# Patient Record
Sex: FEMALE | Race: BLACK OR AFRICAN AMERICAN | NOT HISPANIC OR LATINO | Employment: FULL TIME | URBAN - METROPOLITAN AREA
[De-identification: names, ages, dates, MRNs, and addresses within clinical notes are randomized per-mention and may not be internally consistent; named-entity substitution may affect disease eponyms.]

---

## 2017-02-03 PROBLEM — J45.909 ASTHMA: Status: ACTIVE | Noted: 2017-02-03

## 2018-05-29 ENCOUNTER — TELEPHONE (OUTPATIENT)
Dept: ENDOCRINOLOGY | Facility: CLINIC | Age: 38
End: 2018-05-29

## 2018-05-29 NOTE — TELEPHONE ENCOUNTER
----- Message from Nedra Arnold RN sent at 5/29/2018  4:52 PM CDT -----  Contact: Nelida       ----- Message -----  From: [facility]  Sent: 5/29/2018   2:33 PM  To: [facility] Staff    Calling to scheduled for pituitary per  Dr. Princess Leong. I was not able to pull up anything in [place] because neither physician came up on new patient tree. Please call her back [phone] (home)   Thanks!

## 2018-08-20 ENCOUNTER — TELEPHONE (OUTPATIENT)
Dept: ENDOCRINOLOGY | Facility: CLINIC | Age: 38
End: 2018-08-20

## 2018-08-23 ENCOUNTER — PATIENT MESSAGE (OUTPATIENT)
Dept: ENDOCRINOLOGY | Facility: CLINIC | Age: 38
End: 2018-08-23

## 2019-01-15 ENCOUNTER — PATIENT MESSAGE (OUTPATIENT)
Dept: ENDOCRINOLOGY | Facility: CLINIC | Age: 39
End: 2019-01-15

## 2019-01-18 ENCOUNTER — OFFICE VISIT (OUTPATIENT)
Dept: ENDOCRINOLOGY | Facility: CLINIC | Age: 39
End: 2019-01-18
Payer: COMMERCIAL

## 2019-01-18 ENCOUNTER — LAB VISIT (OUTPATIENT)
Dept: LAB | Facility: HOSPITAL | Age: 39
End: 2019-01-18
Attending: INTERNAL MEDICINE
Payer: COMMERCIAL

## 2019-01-18 VITALS
HEART RATE: 82 BPM | RESPIRATION RATE: 18 BRPM | BODY MASS INDEX: 37.45 KG/M2 | DIASTOLIC BLOOD PRESSURE: 87 MMHG | HEIGHT: 64 IN | SYSTOLIC BLOOD PRESSURE: 140 MMHG | WEIGHT: 219.38 LBS

## 2019-01-18 DIAGNOSIS — E23.6 PITUITARY MASS: Primary | ICD-10-CM

## 2019-01-18 DIAGNOSIS — E23.6 PITUITARY MASS: ICD-10-CM

## 2019-01-18 DIAGNOSIS — E55.9 HYPOVITAMINOSIS D: Primary | ICD-10-CM

## 2019-01-18 DIAGNOSIS — D21.9 LEIOMYOMA: ICD-10-CM

## 2019-01-18 DIAGNOSIS — E55.9 HYPOVITAMINOSIS D: ICD-10-CM

## 2019-01-18 DIAGNOSIS — E88.819 INSULIN RESISTANCE: ICD-10-CM

## 2019-01-18 DIAGNOSIS — J45.909 UNCOMPLICATED ASTHMA, UNSPECIFIED ASTHMA SEVERITY, UNSPECIFIED WHETHER PERSISTENT: ICD-10-CM

## 2019-01-18 DIAGNOSIS — E88.810 DYSMETABOLIC SYNDROME: ICD-10-CM

## 2019-01-18 DIAGNOSIS — N97.0 INFERTILITY, ANOVULATION: ICD-10-CM

## 2019-01-18 DIAGNOSIS — Z72.820 SLEEP DEPRIVATION: ICD-10-CM

## 2019-01-18 DIAGNOSIS — I10 ESSENTIAL HYPERTENSION: ICD-10-CM

## 2019-01-18 DIAGNOSIS — E16.1 HYPERINSULINEMIA: ICD-10-CM

## 2019-01-18 DIAGNOSIS — N83.202 CYST OF LEFT OVARY: ICD-10-CM

## 2019-01-18 DIAGNOSIS — L68.0 HIRSUTISM: ICD-10-CM

## 2019-01-18 DIAGNOSIS — E66.9 CLASS 2 OBESITY WITHOUT SERIOUS COMORBIDITY IN ADULT, UNSPECIFIED BMI, UNSPECIFIED OBESITY TYPE: ICD-10-CM

## 2019-01-18 LAB
25(OH)D3+25(OH)D2 SERPL-MCNC: 13 NG/ML
ALBUMIN SERPL BCP-MCNC: 3.9 G/DL
ALP SERPL-CCNC: 63 U/L
ALT SERPL W/O P-5'-P-CCNC: 18 U/L
ANION GAP SERPL CALC-SCNC: 9 MMOL/L
AST SERPL-CCNC: 29 U/L
BASOPHILS # BLD AUTO: 0.02 K/UL
BASOPHILS NFR BLD: 0.3 %
BILIRUB SERPL-MCNC: 0.3 MG/DL
BUN SERPL-MCNC: 13 MG/DL
CA-I BLDV-SCNC: 1.3 MMOL/L
CALCIUM SERPL-MCNC: 9.6 MG/DL
CHLORIDE SERPL-SCNC: 106 MMOL/L
CHOLEST SERPL-MCNC: 139 MG/DL
CHOLEST/HDLC SERPL: 3 {RATIO}
CO2 SERPL-SCNC: 22 MMOL/L
CORTIS SERPL-MCNC: 7.4 UG/DL
CREAT SERPL-MCNC: 0.8 MG/DL
DHEA-S SERPL-MCNC: 311.5 UG/DL
DIFFERENTIAL METHOD: ABNORMAL
EOSINOPHIL # BLD AUTO: 0.4 K/UL
EOSINOPHIL NFR BLD: 6.4 %
ERYTHROCYTE [DISTWIDTH] IN BLOOD BY AUTOMATED COUNT: 13.7 %
EST. GFR  (AFRICAN AMERICAN): >60 ML/MIN/1.73 M^2
EST. GFR  (NON AFRICAN AMERICAN): >60 ML/MIN/1.73 M^2
ESTIMATED AVG GLUCOSE: 105 MG/DL
ESTRADIOL SERPL-MCNC: 52 PG/ML
FSH SERPL-ACNC: 9.2 MIU/ML
GLUCOSE SERPL-MCNC: 80 MG/DL
HBA1C MFR BLD HPLC: 5.3 %
HCT VFR BLD AUTO: 36.5 %
HDLC SERPL-MCNC: 47 MG/DL
HDLC SERPL: 33.8 %
HGB BLD-MCNC: 11.4 G/DL
IMM GRANULOCYTES # BLD AUTO: 0.01 K/UL
IMM GRANULOCYTES NFR BLD AUTO: 0.2 %
INSULIN COLLECTION INTERVAL: 0
INSULIN SERPL-ACNC: 15.5 UU/ML
LDLC SERPL CALC-MCNC: 85.8 MG/DL
LH SERPL-ACNC: 4.9 MIU/ML
LYMPHOCYTES # BLD AUTO: 2.6 K/UL
LYMPHOCYTES NFR BLD: 45.4 %
MCH RBC QN AUTO: 26.7 PG
MCHC RBC AUTO-ENTMCNC: 31.2 G/DL
MCV RBC AUTO: 86 FL
MONOCYTES # BLD AUTO: 0.3 K/UL
MONOCYTES NFR BLD: 5.8 %
NEUTROPHILS # BLD AUTO: 2.4 K/UL
NEUTROPHILS NFR BLD: 41.9 %
NONHDLC SERPL-MCNC: 92 MG/DL
NRBC BLD-RTO: 0 /100 WBC
OSMOLALITY SERPL: 290 MOSM/KG
PLATELET # BLD AUTO: 307 K/UL
PMV BLD AUTO: 10.6 FL
POTASSIUM SERPL-SCNC: 4 MMOL/L
PROLACTIN SERPL IA-MCNC: 21.3 NG/ML
PROT SERPL-MCNC: 8.2 G/DL
RBC # BLD AUTO: 4.27 M/UL
SODIUM SERPL-SCNC: 137 MMOL/L
T3 SERPL-MCNC: 99 NG/DL
T4 FREE SERPL-MCNC: 1.19 NG/DL
TRIGL SERPL-MCNC: 31 MG/DL
TSH SERPL DL<=0.005 MIU/L-ACNC: 1.71 UIU/ML
URATE SERPL-MCNC: 4.2 MG/DL
WBC # BLD AUTO: 5.82 K/UL

## 2019-01-18 PROCEDURE — 83498 ASY HYDROXYPROGESTERONE 17-D: CPT

## 2019-01-18 PROCEDURE — 82088 ASSAY OF ALDOSTERONE: CPT

## 2019-01-18 PROCEDURE — 3077F SYST BP >= 140 MM HG: CPT | Mod: CPTII,S$GLB,, | Performed by: INTERNAL MEDICINE

## 2019-01-18 PROCEDURE — 99205 OFFICE O/P NEW HI 60 MIN: CPT | Mod: S$GLB,,, | Performed by: INTERNAL MEDICINE

## 2019-01-18 PROCEDURE — 3079F PR MOST RECENT DIASTOLIC BLOOD PRESSURE 80-89 MM HG: ICD-10-PCS | Mod: CPTII,S$GLB,, | Performed by: INTERNAL MEDICINE

## 2019-01-18 PROCEDURE — 82040 ASSAY OF SERUM ALBUMIN: CPT

## 2019-01-18 PROCEDURE — 80053 COMPREHEN METABOLIC PANEL: CPT

## 2019-01-18 PROCEDURE — 82384 ASSAY THREE CATECHOLAMINES: CPT

## 2019-01-18 PROCEDURE — 82157 ASSAY OF ANDROSTENEDIONE: CPT

## 2019-01-18 PROCEDURE — 83930 ASSAY OF BLOOD OSMOLALITY: CPT

## 2019-01-18 PROCEDURE — 84307 ASSAY OF SOMATOSTATIN: CPT

## 2019-01-18 PROCEDURE — 83970 ASSAY OF PARATHORMONE: CPT

## 2019-01-18 PROCEDURE — 80061 LIPID PANEL: CPT

## 2019-01-18 PROCEDURE — 83002 ASSAY OF GONADOTROPIN (LH): CPT

## 2019-01-18 PROCEDURE — 82626 DEHYDROEPIANDROSTERONE: CPT

## 2019-01-18 PROCEDURE — 84443 ASSAY THYROID STIM HORMONE: CPT

## 2019-01-18 PROCEDURE — 3077F PR MOST RECENT SYSTOLIC BLOOD PRESSURE >= 140 MM HG: ICD-10-PCS | Mod: CPTII,S$GLB,, | Performed by: INTERNAL MEDICINE

## 2019-01-18 PROCEDURE — 99999 PR PBB SHADOW E&M-EST. PATIENT-LVL IV: CPT | Mod: PBBFAC,,, | Performed by: INTERNAL MEDICINE

## 2019-01-18 PROCEDURE — 3079F DIAST BP 80-89 MM HG: CPT | Mod: CPTII,S$GLB,, | Performed by: INTERNAL MEDICINE

## 2019-01-18 PROCEDURE — 84439 ASSAY OF FREE THYROXINE: CPT

## 2019-01-18 PROCEDURE — 82024 ASSAY OF ACTH: CPT

## 2019-01-18 PROCEDURE — 83520 IMMUNOASSAY QUANT NOS NONAB: CPT

## 2019-01-18 PROCEDURE — 82672 ASSAY OF ESTROGEN: CPT

## 2019-01-18 PROCEDURE — 83880 ASSAY OF NATRIURETIC PEPTIDE: CPT

## 2019-01-18 PROCEDURE — 84550 ASSAY OF BLOOD/URIC ACID: CPT

## 2019-01-18 PROCEDURE — 99999 PR PBB SHADOW E&M-EST. PATIENT-LVL IV: ICD-10-PCS | Mod: PBBFAC,,, | Performed by: INTERNAL MEDICINE

## 2019-01-18 PROCEDURE — 83001 ASSAY OF GONADOTROPIN (FSH): CPT

## 2019-01-18 PROCEDURE — 3008F BODY MASS INDEX DOCD: CPT | Mod: CPTII,S$GLB,, | Performed by: INTERNAL MEDICINE

## 2019-01-18 PROCEDURE — 83003 ASSAY GROWTH HORMONE (HGH): CPT

## 2019-01-18 PROCEDURE — 3008F PR BODY MASS INDEX (BMI) DOCUMENTED: ICD-10-PCS | Mod: CPTII,S$GLB,, | Performed by: INTERNAL MEDICINE

## 2019-01-18 PROCEDURE — 86316 IMMUNOASSAY TUMOR OTHER: CPT

## 2019-01-18 PROCEDURE — 84588 ASSAY OF VASOPRESSIN: CPT

## 2019-01-18 PROCEDURE — 82670 ASSAY OF TOTAL ESTRADIOL: CPT

## 2019-01-18 PROCEDURE — 84244 ASSAY OF RENIN: CPT

## 2019-01-18 PROCEDURE — 83520 IMMUNOASSAY QUANT NOS NONAB: CPT | Mod: 59

## 2019-01-18 PROCEDURE — 85025 COMPLETE CBC W/AUTO DIFF WBC: CPT

## 2019-01-18 PROCEDURE — 82306 VITAMIN D 25 HYDROXY: CPT

## 2019-01-18 PROCEDURE — 83525 ASSAY OF INSULIN: CPT

## 2019-01-18 PROCEDURE — 83036 HEMOGLOBIN GLYCOSYLATED A1C: CPT

## 2019-01-18 PROCEDURE — 82330 ASSAY OF CALCIUM: CPT

## 2019-01-18 PROCEDURE — 84480 ASSAY TRIIODOTHYRONINE (T3): CPT

## 2019-01-18 PROCEDURE — 84146 ASSAY OF PROLACTIN: CPT

## 2019-01-18 PROCEDURE — 82533 TOTAL CORTISOL: CPT

## 2019-01-18 PROCEDURE — 84305 ASSAY OF SOMATOMEDIN: CPT

## 2019-01-18 PROCEDURE — 99205 PR OFFICE/OUTPT VISIT, NEW, LEVL V, 60-74 MIN: ICD-10-PCS | Mod: S$GLB,,, | Performed by: INTERNAL MEDICINE

## 2019-01-18 PROCEDURE — 82627 DEHYDROEPIANDROSTERONE: CPT

## 2019-01-18 RX ORDER — MINERAL OIL
180 ENEMA (ML) RECTAL DAILY
COMMUNITY

## 2019-01-18 RX ORDER — ERGOCALCIFEROL 1.25 MG/1
50000 CAPSULE ORAL
Qty: 12 CAPSULE | Refills: 3 | Status: SHIPPED | OUTPATIENT
Start: 2019-01-18 | End: 2019-04-18

## 2019-01-18 RX ORDER — LABETALOL 100 MG/1
TABLET, FILM COATED ORAL
COMMUNITY
End: 2024-02-05 | Stop reason: SDUPTHER

## 2019-01-18 RX ORDER — AZELASTINE HYDROCHLORIDE, FLUTICASONE PROPIONATE 137; 50 UG/1; UG/1
SPRAY, METERED NASAL
COMMUNITY

## 2019-01-18 RX ORDER — METFORMIN HYDROCHLORIDE 500 MG/1
500 TABLET ORAL 2 TIMES DAILY WITH MEALS
Qty: 180 TABLET | Refills: 3 | Status: SHIPPED | OUTPATIENT
Start: 2019-01-18 | End: 2020-02-28

## 2019-01-18 RX ORDER — AMLODIPINE BESYLATE 10 MG/1
TABLET ORAL
COMMUNITY
End: 2021-05-14

## 2019-01-18 NOTE — PROGRESS NOTES
Subjective:      Patient ID: Nelida Naqvi is a 38 y.o. female.    Chief Complaint:      38 yr old lady seen for initial care visit today on account of pituitary mass.    History of Present Illness    Patient is a 38 yr old lady seen for initial care visit today on account of reported pituitary mass.  Her back ground comorbidities are as detailed below;  Patient Active Problem List   Diagnosis    Obesity    Leiomyoma    Asthma    Pituitary mass    Dysmetabolic syndrome    Essential hypertension     Patients baseline [place] score is 16. Patient has not had a prior sleep study.  Patient reportedly snores and possible apneic spells. Reportedly also has waht appear to be intermittent gasping sounds during sleep.  She does not have early morning headaches. She dreams frequently. She does have mid day somnolence.   She is not a restless sleeper.  Patient was found to have prolactin elevation and had been on bromocriptine and then dostinex. She has been of the medications since September 2018 and this was all found in the course of work up for infertility.  Most recent prolactin from 12/18 was 21.9 (wnl). She had HBA1c of 5.1 , ACTh of 10.5 and IGF-1 of 168 from 05/18 which were all normal.  Patient never had galactorrrhea.  Patient has had irregular periods; irregular both in length and monthly occurrence. K; 7-10/  28-30 menorrhagia ++, Has no major dysmenorrhea but does have premenstrual cramping.  She does have a history of significant dyspepsia. No family history of major peptic ulcer disease.  There is no family history calcium problems or kidney stones. No family histiry of pituitary nor brain tumors.  Patient has hirsutism since her 20s. There is some family history of hirsuitism  Menarche; 8. Her periods had been quite regular when she was younger. She has no sisters.  There is no history of cosanguintiy.  Grav 0. She does have a history of infertility.      Patient has seen a reproductive  "endocrinologist (Dr Tipton @ [facility]) but had just a basic evaluation and was being considered for ovulation induction.   There is s strong family history of diabetes especially on paternal side of her family.  Patient does not smoke.  Patient drinks 1-2 a months; mainly wine.  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      Review of Systems   Constitutional: Negative for activity change, appetite change, chills, diaphoresis, fatigue and unexpected weight change.   HENT: Negative for facial swelling, sore throat, trouble swallowing and voice change.    Eyes: Negative for photophobia and visual disturbance.   Respiratory: Negative for cough and shortness of breath.    Cardiovascular: Negative for chest pain, palpitations and leg swelling.   Gastrointestinal: Negative for abdominal distention, abdominal pain, constipation, diarrhea, nausea and vomiting.   Endocrine: Negative for cold intolerance, heat intolerance, polydipsia, polyphagia and polyuria.   Genitourinary: Negative for dysuria, frequency and urgency.   Musculoskeletal: Negative for arthralgias, back pain, gait problem, joint swelling, myalgias, neck pain and neck stiffness.   Skin: Negative for color change, pallor and rash.   Neurological: Negative for dizziness, tremors, seizures, weakness, light-headedness, numbness and headaches.   Hematological: Does not bruise/bleed easily.   Psychiatric/Behavioral: Positive for sleep disturbance (As detailed above). Negative for agitation, confusion, dysphoric mood and hallucinations. The patient is not nervous/anxious.        Objective: Resp 18   Ht 5' 4" (1.626 m)   Wt 99.5 kg (219 lb 5.7 oz)   LMP 12/18/2018   BMI 37.65 kg/m²  Resp 18   Ht 5' 4" (1.626 m)   Wt 99.5 kg (219 lb 5.7 oz)   LMP 12/18/2018   BMI 37.65 kg/m²  Body surface area is 2.12 meters squared. Resp 18   Ht 5' 4" (1.626 m)   Wt 99.5 kg (219 lb 5.7 oz)   LMP 12/18/2018   BMI 37.65 kg/m²  /87 RR; 18               Physical Exam "   Constitutional: She is oriented to person, place, and time. Vital signs are normal. She appears well-developed and well-nourished. She does not appear ill. No distress.   Pleasant young lady. Not pale, anicteric and afebrile. Well hydrated. Not in any acute distress. Mildly hirsuite; manly in the chin and upper lip areas.  Has associated mild facial acne and a few neck and upper chest skin tags.   HENT:   Head: Normocephalic and atraumatic. Not macrocephalic. Head is without right periorbital erythema and without left periorbital erythema. Hair is normal.       Nose: Nose normal.   Mouth/Throat: Oropharynx is clear and moist. Mucous membranes are not pale and not dry. No oropharyngeal exudate.   Has nuchal AN and mallampti grade 3 fauces with large non inflammed tonsils than nearl abut in the mid line.   Eyes: Conjunctivae, EOM and lids are normal. Pupils are equal, round, and reactive to light. Right eye exhibits no discharge. Left eye exhibits no discharge. No scleral icterus.   Neck: Trachea normal, normal range of motion, full passive range of motion without pain and phonation normal. Neck supple. Normal carotid pulses and no JVD present. Carotid bruit is not present. No tracheal deviation present. No thyroid mass and no thyromegaly present.       Cardiovascular: Normal rate, regular rhythm, S1 normal, S2 normal, normal heart sounds, intact distal pulses and normal pulses. PMI is not displaced. Exam reveals no gallop.   No murmur heard.  Pulmonary/Chest: Effort normal and breath sounds normal. No respiratory distress. She has no wheezes. She has no rales.   Abdominal: Soft. Bowel sounds are normal. She exhibits no distension and no mass. There is no hepatosplenomegaly, splenomegaly or hepatomegaly. There is no tenderness. There is no rebound, no guarding and no CVA tenderness. No hernia. Hernia confirmed negative in the ventral area.   Obese anterior abdominal wall with no striae.   Musculoskeletal: She  exhibits no edema or tenderness.        Right shoulder: She exhibits normal range of motion, no bony tenderness, no crepitus, no deformity, no pain, no spasm, normal pulse and normal strength.   No pedal edema and no calf swelling.   Lymphadenopathy:     She has no cervical adenopathy.        Right: No inguinal adenopathy present.        Left: No inguinal adenopathy present.   Neurological: She is alert and oriented to person, place, and time. She has normal strength and normal reflexes. She displays no atrophy, no tremor and normal reflexes. No cranial nerve deficit or sensory deficit. She exhibits normal muscle tone. Coordination and gait normal.   Skin: Skin is warm, dry and intact. No bruising, no ecchymosis, no petechiae and no rash noted. She is not diaphoretic. No cyanosis or erythema. No pallor. Nails show no clubbing.   Psychiatric: She has a normal mood and affect. Her speech is normal and behavior is normal. Judgment and thought content normal. Cognition and memory are normal.   Nursing note and vitals reviewed.      Lab Review:   No recent labs available for review in the [facility] data repository.    Assessment:     1. Pituitary mass  MRI Brain W WO Contrast    Alpha Sub Unit    Arginine vasopressin hormone    Brain natriuretic peptide    Insulin-like growth factor    IGF Binding Protein 3    Chromogranin A    Comprehensive metabolic panel    Luteinizing hormone    Follicle stimulating hormone    Prolactin    Somatostatin    CBC auto differential    Cortisol    ACTH    Catecholamines, fractionated, plasma    Osmolality, Serum    Osmolality, urine    Testosterone Panel    Estrogens, total    Estradiol    Growth hormone   2. Dysmetabolic syndrome  Comprehensive metabolic panel    TSH    T4, free    T3    CBC auto differential    Hemoglobin A1c    Uric acid    metFORMIN (GLUCOPHAGE) 500 MG tablet   3. Essential hypertension  Comprehensive metabolic panel    TSH    T4, free    T3    Catecholamines,  fractionated, plasma    Microalbumin/creatinine urine ratio    Lipid panel    Aldosterone    Renin   4. Leiomyoma     5. Class 2 obesity without serious comorbidity in adult, unspecified BMI, unspecified obesity type     6. Uncomplicated asthma, unspecified asthma severity, unspecified whether persistent     7. Hypovitaminosis D  Vitamin D    PTH, intact    Calcium, ionized   8. Cyst of left ovary     9. Hirsutism  Insulin, random    DHEA    DHEA-sulfate    Androstenedione    17-Hydroxyprogesterone    metFORMIN (GLUCOPHAGE) 500 MG tablet   10. Infertility, anovulation        Regarding pituitary mass; this appears to be a pituitary microadenoma incidentally found that has little clinical imprt at this point. Will check full adeno and neurophyophyseal function tests. To retrieve results of recent brain MRI for review. If  Current prolactin is normal wont need any active treatmnent for this at the moment.  Regarding dysmetabolic syndrome and obesity; to obtain screening labs as detailed above to evaluate current glycemic status and lipid profile.  Regarding cysts in ovary and hirsutism; likely has PCOS but will obtain full endocrine testing to evaluate for this and exclude improtant diff diagnoses like CAH. To commence low dose metformin 500mg BID and also to commence ovulation testing. If she is not ovulating consistently will then commence trial of clomiphene to improve this and enhance her fertility.  Regarding infertility; likely related to PCOs. Will await results of full endocrine and repro work up in this regard and will discuss this in more depth at her ext ffup visit. To rettrive and review her prior pelvic USS from last year.  Regarding sleep deprivation; to obtain screening Polysomogram.  Regarding essential hypertension; mildly elevated BP today. To continue present antihypertensive regimen for now. To ensure ambulatory tracking of BP trends and at ffup visit depending on results may adjust present  antihypertenive regimen.  I spent over 45minutes face to face discussing and counselling with the patient the intended plan of investigation and management.       Plan:       FFup in ~ 3mths

## 2019-01-19 LAB
BNP SERPL-MCNC: <10 PG/ML
IGF BP3 SERPL-MCNC: 4.2 MCG/ML
PTH-INTACT SERPL-MCNC: 103 PG/ML

## 2019-01-21 LAB
17OHP SERPL-MCNC: 107 NG/DL
ALDOST SERPL-MCNC: 5.1 NG/DL
ESTROGEN SERPL-MCNC: 173 PG/ML
IGF-I SERPL-MCNC: 148 NG/ML (ref 54–258)
IGF-I Z-SCORE SERPL: 0.39 SD

## 2019-01-22 LAB
ACTH PLAS-MCNC: 22 PG/ML
CGA SERPL-MCNC: 75 NG/ML (ref 0–95)
GH SERPL-MCNC: 0.3 NG/ML
RENIN PLAS-CCNC: 0.6 NG/ML/H

## 2019-01-23 LAB
A-PGH SER-MCNC: 0.4 NG/ML
ALBUMIN SERPL-MCNC: 4.5 G/DL (ref 3.6–5.1)
ANDROST SERPL-MCNC: 93 NG/DL
DHEA SERPL-MCNC: 3.57 NG/ML (ref 0.63–4.7)
SHBG SERPL-SCNC: 46 NMOL/L (ref 17–124)
TESTOST FREE SERPL-MCNC: 2.9 PG/ML (ref 0.2–5)
TESTOST SERPL-MCNC: 32 NG/DL (ref 2–45)
TESTOSTERONE.FREE+WB SERPL-MCNC: 6 NG/DL (ref 0.5–8.5)

## 2019-01-27 LAB
CATECHOLS PLAS-MCNC: 656 PG/ML
DOPAMINE SERPL-MCNC: 25 PG/ML
EPINEPH PLAS-MCNC: <20 PG/ML
NOREPINEPH PLAS-MCNC: 631 PG/ML
VASOPRESSIN SERPL-MCNC: 1.1 PG/ML (ref 0–6.9)

## 2019-02-02 LAB — SOMATOSTAT PLAS-MCNC: 25 PG/ML

## 2019-05-28 ENCOUNTER — OFFICE VISIT (OUTPATIENT)
Dept: ENDOCRINOLOGY | Facility: CLINIC | Age: 39
End: 2019-05-28
Payer: COMMERCIAL

## 2019-05-28 VITALS
HEIGHT: 65 IN | HEART RATE: 88 BPM | DIASTOLIC BLOOD PRESSURE: 86 MMHG | SYSTOLIC BLOOD PRESSURE: 144 MMHG | BODY MASS INDEX: 35.37 KG/M2 | WEIGHT: 212.31 LBS

## 2019-05-28 DIAGNOSIS — L68.0 HIRSUTISM: ICD-10-CM

## 2019-05-28 DIAGNOSIS — I10 ESSENTIAL HYPERTENSION: ICD-10-CM

## 2019-05-28 DIAGNOSIS — N97.0 INFERTILITY, ANOVULATION: ICD-10-CM

## 2019-05-28 DIAGNOSIS — E88.819 INSULIN RESISTANCE: ICD-10-CM

## 2019-05-28 DIAGNOSIS — E88.810 DYSMETABOLIC SYNDROME: ICD-10-CM

## 2019-05-28 DIAGNOSIS — E16.1 HYPERINSULINEMIA: ICD-10-CM

## 2019-05-28 DIAGNOSIS — E66.9 OBESITY (BMI 30-39.9): ICD-10-CM

## 2019-05-28 DIAGNOSIS — E23.6 PITUITARY MASS: Primary | ICD-10-CM

## 2019-05-28 PROCEDURE — 99214 OFFICE O/P EST MOD 30 MIN: CPT | Mod: S$GLB,,, | Performed by: INTERNAL MEDICINE

## 2019-05-28 PROCEDURE — 3079F PR MOST RECENT DIASTOLIC BLOOD PRESSURE 80-89 MM HG: ICD-10-PCS | Mod: CPTII,S$GLB,, | Performed by: INTERNAL MEDICINE

## 2019-05-28 PROCEDURE — 3077F SYST BP >= 140 MM HG: CPT | Mod: CPTII,S$GLB,, | Performed by: INTERNAL MEDICINE

## 2019-05-28 PROCEDURE — 3008F BODY MASS INDEX DOCD: CPT | Mod: CPTII,S$GLB,, | Performed by: INTERNAL MEDICINE

## 2019-05-28 PROCEDURE — 99214 PR OFFICE/OUTPT VISIT, EST, LEVL IV, 30-39 MIN: ICD-10-PCS | Mod: S$GLB,,, | Performed by: INTERNAL MEDICINE

## 2019-05-28 PROCEDURE — 3008F PR BODY MASS INDEX (BMI) DOCUMENTED: ICD-10-PCS | Mod: CPTII,S$GLB,, | Performed by: INTERNAL MEDICINE

## 2019-05-28 PROCEDURE — 99999 PR PBB SHADOW E&M-EST. PATIENT-LVL III: ICD-10-PCS | Mod: PBBFAC,,, | Performed by: INTERNAL MEDICINE

## 2019-05-28 PROCEDURE — 3077F PR MOST RECENT SYSTOLIC BLOOD PRESSURE >= 140 MM HG: ICD-10-PCS | Mod: CPTII,S$GLB,, | Performed by: INTERNAL MEDICINE

## 2019-05-28 PROCEDURE — 3079F DIAST BP 80-89 MM HG: CPT | Mod: CPTII,S$GLB,, | Performed by: INTERNAL MEDICINE

## 2019-05-28 PROCEDURE — 99999 PR PBB SHADOW E&M-EST. PATIENT-LVL III: CPT | Mod: PBBFAC,,, | Performed by: INTERNAL MEDICINE

## 2019-05-28 RX ORDER — ASPIRIN 81 MG/1
81 TABLET ORAL DAILY
COMMUNITY

## 2019-05-28 NOTE — PROGRESS NOTES
Subjective:      Patient ID: Nelida Naqvi is a 39 y.o. female.    Chief Complaint:     39 yr old lady seen in [facility] today on account of pituitary mass.       History of Present Illness    Patient is a 38 yr old lady seen for initial care visit today on account of reported pituitary mass.  Her back ground comorbidities are as detailed below;      Patient Active Problem List   Diagnosis    Obesity    Leiomyoma    Asthma    Pituitary mass    Dysmetabolic syndrome    Essential hypertension      Patients baseline [place] score is 16. Patient has not had a prior sleep study.  Patient reportedly snores and possible apneic spells. Reportedly also has waht appear to be intermittent gasping sounds during sleep.  She does not have early morning headaches. She dreams frequently. She does have mid day somnolence.   She is not a restless sleeper.  Patient was found to have prolactin elevation and had been on bromocriptine and then dostinex. She has been of the medications since September 2018 and this was all found in the course of work up for infertility.  Most recent prolactin from 12/18 was 21.9 (wnl). She had HBA1c of 5.1 , ACTh of 10.5 and IGF-1 of 168 from 05/18 which were all normal.  Patient never had galactorrrhea.  Patient has had irregular periods; irregular both in length and monthly occurrence. K; 7-10/  28-30 menorrhagia ++, Has no major dysmenorrhea but does have premenstrual cramping.  She does have a history of significant dyspepsia. No family history of major peptic ulcer disease.  There is no family history calcium problems or kidney stones. No family histiry of pituitary nor brain tumors.  Patient has hirsutism since her 20s. There is some family history of hirsuitism.    Menarche; 8. Her periods had been quite regular when she was younger. She has no sisters.  There is no history of cosanguintiy.  Grav 0. She does have a history of infertility.        Patient has seen a reproductive  "endocrinologist (Dr Tipton @ [facility]) but had just a basic evaluation and was being considered for ovulation induction.   There is s strong family history of diabetes especially on paternal side of her family.  Patient does not smoke.  Patient drinks 1-2 a months; mainly wine.  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  Review of the baseline pituitary functional screening labs done during her initial visit with us showed no functional adeno nor neurohypophyseal deficits.    Review of Systems   HENT: Negative for facial swelling and trouble swallowing.    Respiratory: Negative for wheezing.    Cardiovascular: Negative for chest pain, palpitations and leg swelling.   Gastrointestinal: Negative for abdominal pain, diarrhea and vomiting.   Endocrine: Negative for polyuria.   Genitourinary: Negative for dysuria and menstrual problem.   Musculoskeletal: Negative for arthralgias, gait problem and myalgias.   Skin: Negative for color change, pallor and rash.   Neurological: Negative for dizziness and light-headedness.   Hematological: Does not bruise/bleed easily.   Psychiatric/Behavioral: Positive for sleep disturbance. Negative for dysphoric mood.       Objective: BP (!) 144/86 (BP Location: Right arm, Patient Position: Sitting, BP Method: Medium (Automatic))   Pulse 88   Ht 5' 5" (1.651 m)   Wt 96.3 kg (212 lb 4.9 oz)   BMI 35.33 kg/m²  Body surface area is 2.1 meters squared.         Physical Exam   Constitutional: She appears well-developed and well-nourished. No distress.   Pleasant middle aged lady. Not pale, anicteric, afebrile. Well hydrated. Not in any acute distress.    HENT:   Head: Normocephalic and atraumatic.   Eyes: Pupils are equal, round, and reactive to light. Conjunctivae and EOM are normal. No scleral icterus.   Neck: Normal range of motion. Neck supple. No JVD present. No tracheal deviation present. No thyromegaly present.   Cardiovascular: Normal rate, regular rhythm and normal heart sounds. "   Pulmonary/Chest: Effort normal and breath sounds normal. No stridor. No respiratory distress. She has no wheezes.   Abdominal: Soft. Bowel sounds are normal.   Obese anterior abdominal wall.   Musculoskeletal: She exhibits no edema.   Neurological: She is alert. No cranial nerve deficit.   Skin: Skin is warm and dry. No rash noted. She is not diaphoretic. No erythema. No pallor.   Psychiatric: She has a normal mood and affect. Her behavior is normal. Judgment and thought content normal.   Vitals reviewed.      Lab Review:     Results for EULA MUÑIZ (MRN [id number]) as of 5/28/2019 16:41   Ref. Range 1/18/2019 09:21 1/18/2019 09:40 1/18/2019 09:41   WBC Latest Ref Range: 3.90 - 12.70 K/uL   5.82   RBC Latest Ref Range: 4.00 - 5.40 M/uL   4.27   Hemoglobin Latest Ref Range: 12.0 - 16.0 g/dL   11.4 (L)   Hematocrit Latest Ref Range: 37.0 - 48.5 %   36.5 (L)   MCV Latest Ref Range: 82 - 98 fL   86   MCH Latest Ref Range: 27.0 - 31.0 pg   26.7 (L)   MCHC Latest Ref Range: 32.0 - 36.0 g/dL   31.2 (L)   RDW Latest Ref Range: 11.5 - 14.5 %   13.7   Platelets Latest Ref Range: 150 - 350 K/uL   307   MPV Latest Ref Range: 9.2 - 12.9 fL   10.6   Gran% Latest Ref Range: 38.0 - 73.0 %   41.9   Gran # (ANC) Latest Ref Range: 1.8 - 7.7 K/uL   2.4   Lymph% Latest Ref Range: 18.0 - 48.0 %   45.4   Lymph # Latest Ref Range: 1.0 - 4.8 K/uL   2.6   Mono% Latest Ref Range: 4.0 - 15.0 %   5.8   Mono # Latest Ref Range: 0.3 - 1.0 K/uL   0.3   Eosinophil% Latest Ref Range: 0.0 - 8.0 %   6.4   Eos # Latest Ref Range: 0.0 - 0.5 K/uL   0.4   Basophil% Latest Ref Range: 0.0 - 1.9 %   0.3   Baso # Latest Ref Range: 0.00 - 0.20 K/uL   0.02   nRBC Latest Ref Range: 0 /100 WBC   0   Differential Method Unknown   Automated   Immature Grans (Abs) Latest Ref Range: 0.00 - 0.04 K/uL   0.01   Immature Granulocytes Latest Ref Range: 0.0 - 0.5 %   0.2   Sodium Latest Ref Range: 136 - 145 mmol/L   137   Potassium Latest Ref Range: 3.5 - 5.1  mmol/L   4.0   Chloride Latest Ref Range: 95 - 110 mmol/L   106   CO2 Latest Ref Range: 23 - 29 mmol/L   22 (L)   Anion Gap Latest Ref Range: 8 - 16 mmol/L   9   BUN, Bld Latest Ref Range: 6 - 20 mg/dL   13   Creatinine Latest Ref Range: 0.5 - 1.4 mg/dL   0.8   eGFR if non African American Latest Ref Range: >60 mL/min/1.73 m^2   >60.0   eGFR if African American Latest Ref Range: >60 mL/min/1.73 m^2   >60.0   Glucose Latest Ref Range: 70 - 110 mg/dL   80   Calcium Latest Ref Range: 8.7 - 10.5 mg/dL   9.6   Calcium, Ion Latest Ref Range: 1.06 - 1.42 mmol/L   1.30   Alkaline Phosphatase Latest Ref Range: 55 - 135 U/L   63   PROTEIN TOTAL Latest Ref Range: 6.0 - 8.4 g/dL   8.2   Albumin Latest Ref Range: 3.5 - 5.2 g/dL   3.9   Uric Acid Latest Ref Range: 2.4 - 5.7 mg/dL   4.2   BILIRUBIN TOTAL Latest Ref Range: 0.1 - 1.0 mg/dL   0.3   AST Latest Ref Range: 10 - 40 U/L   29   ALT Latest Ref Range: 10 - 44 U/L   18   Triglycerides Latest Ref Range: 30 - 150 mg/dL   31   Cholesterol Latest Ref Range: 120 - 199 mg/dL   139   HDL Latest Ref Range: 40 - 75 mg/dL   47   Hdl/Cholesterol Ratio Latest Ref Range: 20.0 - 50.0 %   33.8   LDL Cholesterol External Latest Ref Range: 63.0 - 159.0 mg/dL   85.8   Non-HDL Cholesterol Latest Units: mg/dL   92   Total Cholesterol/HDL Ratio Latest Ref Range: 2.0 - 5.0    3.0   BNP Latest Ref Range: 0 - 99 pg/mL   <10   Vit D, 25-Hydroxy Latest Ref Range: 30 - 96 ng/mL   13 (L)   17-Hydroxyprogesterone Latest Ref Range: 35 - 413 ng/dL  107    ALDOSTERONE Latest Units: ng/dL   5.1   Androstenedione Latest Ref Range: see text ng/dL   93   Antidiuretic Hormone Latest Ref Range: 0.0 - 6.9 pg/mL   1.1   Catecholamine, Plasma Latest Units: pg/mL  656    Cortisol Latest Units: ug/dL   7.40   Dopamine Latest Units: pg/mL  25 (H)    Epinephrine Latest Units: pg/mL  <20    Norepinephrine Latest Units: pg/mL  631    Hemoglobin A1C External Latest Ref Range: 4.0 - 5.6 %   5.3   Estimated Avg Glucose  Latest Ref Range: 68 - 131 mg/dL   105   Insulin Latest Ref Range: <25.0 uU/mL  15.5    Insulin Collection Interval Unknown  [id number]    ACTH Latest Ref Range: 0 - 46 pg/mL  22    Growth Hormone Latest Ref Range: 0.0 - 8.0 ng/mL   0.3   Insulin-Like GFBP-3 Latest Ref Range: 3.4 - 6.7 mcg/mL   4.2   Somatomedin (IGF-I) Latest Ref Range: 54 - 258 ng/mL   148   TSH Latest Ref Range: 0.400 - 4.000 uIU/mL   1.713   T3, Total Latest Ref Range: 60 - 180 ng/dL   99   Free T4 Latest Ref Range: 0.71 - 1.51 ng/dL   1.19   PTH Latest Ref Range: 9.0 - 77.0 pg/mL   103.0 (H)   Alpha Sub Unit, Serum Latest Units: ng/mL   0.4   Chromogranin A Latest Ref Range: 0 - 95 ng/mL   75   Albumin Latest Ref Range: 3.6 - 5.1 g/dL  4.5    DHEA Latest Ref Range: 0.630 - 4.700 ng/mL   3.570   DHEA-SO4 Latest Ref Range: 74.8 - 410.2 ug/dL   311.5   Estradiol Latest Ref Range: See Text pg/mL   52   Estrogen Latest Units: pg/mL   173   FSH Latest Ref Range: See Text mIU/mL   9.20   LH Latest Ref Range: See Text mIU/mL   4.9   Prolactin Latest Ref Range: 5.2 - 26.5 ng/mL   21.3   Sex Hormone Binding Globulin Latest Ref Range: 17 - 124 nmol/L  46    Testosterone Latest Ref Range: 2 - 45 ng/dL  32    Testosterone Testosterone Latest Ref Range: 0.5 - 8.5 ng/dL  6.0    Testosterone, Free Latest Ref Range: 0.2 - 5.0 pg/mL  2.9    Microalbum.,U,Random Latest Units: ug/mL 8.0     Creatinine, Random Ur Latest Ref Range: 15.0 - 325.0 mg/dL 99.0     MICROALB/CREAT RATIO Latest Ref Range: 0.0 - 30.0 ug/mg 8.1     Osmolality, Ur Latest Ref Range: 50 - 1200 mOsm/kg 632     Z Score Latest Ref Range: -2.0 - 2.0 SD   0.39   Osmolality Latest Ref Range: 275 - 295 mOsm/kg   290   Renin Activity Latest Units: ng/mL/h  0.6    Somatostatin Latest Units: pg/mL  25        Assessment:     1. Pituitary mass     2. Dysmetabolic syndrome     3. Obesity (BMI 30-39.9)     4. Hirsutism     5. Essential hypertension     6. Infertility, anovulation     7. Insulin resistance     8.  Hyperinsulinemia          Regarding pituitary mass; this appears to be a pituitary microadenoma incidentally found that has little clinical imprt at this point. Will check full adeno and neurophyophyseal function tests. To retrieve results of recent brain MRI for review. Functional testing normal. No active intervention indicated at the moment.  Regarding dysmetabolic syndrome and obesity; Stable. To continue low dose metformin as before.  Regarding cysts in ovary and hirsutism; No biochemical evidence of PCOS nor of other identifiable cause of hyperandrogenism. Her hirsutism appears to be familial.To continue low dose metformin 500mg BID and also to commence ovulation testing. If she is not ovulating consistently will then commence trial of clomiphene to improve this and enhance her fertility.  Regarding infertility; No evidence of any significant endocrine etiology. To see based on serial ovulation testing whether or not patient is consistently ovulating as well as guide timing of intercourse relative to optimal fertile period.To rettrive and review her prior pelvic USS from last year.  Regarding sleep deprivation; to obtain screening Polysomogram.  Regarding essential hypertension; mildly elevated BP today. To continue present antihypertensive regimen for now. To ensure ambulatory tracking of BP trends and at ffup visit depending on results may adjust present antihypertenive regimen.      Plan:     FFup in ~ 4mths.

## 2019-10-21 ENCOUNTER — TELEPHONE (OUTPATIENT)
Dept: ENDOCRINOLOGY | Facility: CLINIC | Age: 39
End: 2019-10-21

## 2019-10-21 NOTE — TELEPHONE ENCOUNTER
----- Message from Susan Casper sent at 10/21/2019 12:01 PM CDT -----  Contact: Self   Type: Patient Call Back    Who called: Self     What is the request in detail: Asking to speak with office to be resched no available slot are populating.     Can the clinic reply by [facility]? Call   Asking to speak with office to be resched no available slot are populating.   Would the patient rather a call back or a response via [facility]?  Call     Best call back number: [phone]  Additional Information:

## 2020-02-27 DIAGNOSIS — L68.0 HIRSUTISM: ICD-10-CM

## 2020-02-27 DIAGNOSIS — E88.810 DYSMETABOLIC SYNDROME: ICD-10-CM

## 2020-02-28 RX ORDER — METFORMIN HYDROCHLORIDE 500 MG/1
TABLET ORAL
Qty: 180 TABLET | Refills: 3 | Status: SHIPPED | OUTPATIENT
Start: 2020-02-28

## 2021-05-06 ENCOUNTER — PATIENT MESSAGE (OUTPATIENT)
Dept: RESEARCH | Facility: HOSPITAL | Age: 41
End: 2021-05-06

## 2021-05-14 ENCOUNTER — HOSPITAL ENCOUNTER (OUTPATIENT)
Facility: HOSPITAL | Age: 41
Discharge: HOME | End: 2021-05-15
Attending: EMERGENCY MEDICINE | Admitting: INTERNAL MEDICINE
Payer: COMMERCIAL

## 2021-05-14 DIAGNOSIS — R20.0 NUMBNESS AND TINGLING OF LEFT HAND: ICD-10-CM

## 2021-05-14 DIAGNOSIS — R20.2 NUMBNESS AND TINGLING OF LEFT HAND: ICD-10-CM

## 2021-05-14 DIAGNOSIS — I10 ESSENTIAL HYPERTENSION: ICD-10-CM

## 2021-05-14 DIAGNOSIS — I65.21 INTERNAL CAROTID ARTERY STENOSIS, RIGHT: ICD-10-CM

## 2021-05-14 DIAGNOSIS — R07.9 CHEST PAIN, UNSPECIFIED TYPE: Primary | ICD-10-CM

## 2021-05-14 DIAGNOSIS — R20.2 PARESTHESIA: ICD-10-CM

## 2021-05-14 LAB
ALBUMIN SERPL BCP-MCNC: 4.2 G/DL (ref 3.5–5.2)
ALP SERPL-CCNC: 44 U/L (ref 55–135)
ALT SERPL W/O P-5'-P-CCNC: 19 U/L (ref 10–44)
ANION GAP SERPL CALC-SCNC: 8 MMOL/L (ref 8–16)
AST SERPL-CCNC: 44 U/L (ref 10–40)
B-HCG UR QL: NEGATIVE
BASOPHILS # BLD AUTO: 0.02 K/UL (ref 0–0.2)
BASOPHILS NFR BLD: 0.3 % (ref 0–1.9)
BILIRUB SERPL-MCNC: 1 MG/DL (ref 0.1–1)
BILIRUB UR QL STRIP: NEGATIVE
BUN SERPL-MCNC: 13 MG/DL (ref 6–20)
CALCIUM SERPL-MCNC: 9.2 MG/DL (ref 8.7–10.5)
CHLORIDE SERPL-SCNC: 108 MMOL/L (ref 95–110)
CLARITY UR: CLEAR
CO2 SERPL-SCNC: 24 MMOL/L (ref 23–29)
COLOR UR: YELLOW
CREAT SERPL-MCNC: 0.8 MG/DL (ref 0.5–1.4)
CTP QC/QA: YES
DIFFERENTIAL METHOD: ABNORMAL
EOSINOPHIL # BLD AUTO: 0.1 K/UL (ref 0–0.5)
EOSINOPHIL NFR BLD: 1 % (ref 0–8)
ERYTHROCYTE [DISTWIDTH] IN BLOOD BY AUTOMATED COUNT: 13.5 % (ref 11.5–14.5)
EST. GFR  (AFRICAN AMERICAN): >60 ML/MIN/1.73 M^2
EST. GFR  (NON AFRICAN AMERICAN): >60 ML/MIN/1.73 M^2
GLUCOSE SERPL-MCNC: 84 MG/DL (ref 70–110)
GLUCOSE UR QL STRIP: NEGATIVE
HCT VFR BLD AUTO: 34.3 % (ref 37–48.5)
HGB BLD-MCNC: 10.8 G/DL (ref 12–16)
HGB UR QL STRIP: NEGATIVE
IMM GRANULOCYTES # BLD AUTO: 0.01 K/UL (ref 0–0.04)
IMM GRANULOCYTES NFR BLD AUTO: 0.2 % (ref 0–0.5)
KETONES UR QL STRIP: NEGATIVE
LEUKOCYTE ESTERASE UR QL STRIP: NEGATIVE
LYMPHOCYTES # BLD AUTO: 3.1 K/UL (ref 1–4.8)
LYMPHOCYTES NFR BLD: 53.1 % (ref 18–48)
MAGNESIUM SERPL-MCNC: 1.8 MG/DL (ref 1.6–2.6)
MCH RBC QN AUTO: 26 PG (ref 27–31)
MCHC RBC AUTO-ENTMCNC: 31.5 G/DL (ref 32–36)
MCV RBC AUTO: 83 FL (ref 82–98)
MONOCYTES # BLD AUTO: 0.6 K/UL (ref 0.3–1)
MONOCYTES NFR BLD: 9.6 % (ref 4–15)
NEUTROPHILS # BLD AUTO: 2.1 K/UL (ref 1.8–7.7)
NEUTROPHILS NFR BLD: 35.8 % (ref 38–73)
NITRITE UR QL STRIP: NEGATIVE
NRBC BLD-RTO: 0 /100 WBC
PH UR STRIP: 6 [PH] (ref 5–8)
PLATELET # BLD AUTO: 305 K/UL (ref 150–450)
PMV BLD AUTO: 9.6 FL (ref 9.2–12.9)
POTASSIUM SERPL-SCNC: 3.9 MMOL/L (ref 3.5–5.1)
PROT SERPL-MCNC: 7.7 G/DL (ref 6–8.4)
PROT UR QL STRIP: NEGATIVE
RBC # BLD AUTO: 4.16 M/UL (ref 4–5.4)
SARS-COV-2 RDRP RESP QL NAA+PROBE: NEGATIVE
SODIUM SERPL-SCNC: 140 MMOL/L (ref 136–145)
SP GR UR STRIP: >=1.03 (ref 1–1.03)
TROPONIN I SERPL DL<=0.01 NG/ML-MCNC: <0.03 NG/ML
TSH SERPL DL<=0.005 MIU/L-ACNC: 1.82 UIU/ML (ref 0.34–5.6)
URN SPEC COLLECT METH UR: ABNORMAL
UROBILINOGEN UR STRIP-ACNC: NEGATIVE EU/DL
WBC # BLD AUTO: 5.86 K/UL (ref 3.9–12.7)

## 2021-05-14 PROCEDURE — 93010 EKG 12-LEAD: ICD-10-PCS | Mod: ,,, | Performed by: INTERNAL MEDICINE

## 2021-05-14 PROCEDURE — 85025 COMPLETE CBC W/AUTO DIFF WBC: CPT | Performed by: NURSE PRACTITIONER

## 2021-05-14 PROCEDURE — 81025 URINE PREGNANCY TEST: CPT | Performed by: NURSE PRACTITIONER

## 2021-05-14 PROCEDURE — 36415 COLL VENOUS BLD VENIPUNCTURE: CPT | Performed by: NURSE PRACTITIONER

## 2021-05-14 PROCEDURE — 84484 ASSAY OF TROPONIN QUANT: CPT | Performed by: NURSE PRACTITIONER

## 2021-05-14 PROCEDURE — 81003 URINALYSIS AUTO W/O SCOPE: CPT | Performed by: NURSE PRACTITIONER

## 2021-05-14 PROCEDURE — 84443 ASSAY THYROID STIM HORMONE: CPT | Performed by: NURSE PRACTITIONER

## 2021-05-14 PROCEDURE — 93005 ELECTROCARDIOGRAM TRACING: CPT | Performed by: INTERNAL MEDICINE

## 2021-05-14 PROCEDURE — 80053 COMPREHEN METABOLIC PANEL: CPT | Performed by: NURSE PRACTITIONER

## 2021-05-14 PROCEDURE — G0378 HOSPITAL OBSERVATION PER HR: HCPCS

## 2021-05-14 PROCEDURE — U0002 COVID-19 LAB TEST NON-CDC: HCPCS | Performed by: NURSE PRACTITIONER

## 2021-05-14 PROCEDURE — 83735 ASSAY OF MAGNESIUM: CPT | Performed by: NURSE PRACTITIONER

## 2021-05-14 PROCEDURE — 99900031 HC PATIENT EDUCATION (STAT)

## 2021-05-14 PROCEDURE — 93010 ELECTROCARDIOGRAM REPORT: CPT | Mod: ,,, | Performed by: INTERNAL MEDICINE

## 2021-05-14 PROCEDURE — 25000003 PHARM REV CODE 250: Performed by: EMERGENCY MEDICINE

## 2021-05-14 PROCEDURE — 84484 ASSAY OF TROPONIN QUANT: CPT | Mod: 91 | Performed by: NURSE PRACTITIONER

## 2021-05-14 PROCEDURE — 99900035 HC TECH TIME PER 15 MIN (STAT)

## 2021-05-14 PROCEDURE — 99285 EMERGENCY DEPT VISIT HI MDM: CPT | Mod: 25

## 2021-05-14 RX ORDER — IBUPROFEN 200 MG
16 TABLET ORAL
Status: DISCONTINUED | OUTPATIENT
Start: 2021-05-14 | End: 2021-05-15 | Stop reason: HOSPADM

## 2021-05-14 RX ORDER — INSULIN ASPART 100 [IU]/ML
0-5 INJECTION, SOLUTION INTRAVENOUS; SUBCUTANEOUS
Status: DISCONTINUED | OUTPATIENT
Start: 2021-05-14 | End: 2021-05-15 | Stop reason: HOSPADM

## 2021-05-14 RX ORDER — CETIRIZINE HYDROCHLORIDE 10 MG/1
10 TABLET ORAL DAILY
Status: DISCONTINUED | OUTPATIENT
Start: 2021-05-15 | End: 2021-05-15 | Stop reason: HOSPADM

## 2021-05-14 RX ORDER — AMOXICILLIN 250 MG
1 CAPSULE ORAL 2 TIMES DAILY PRN
Status: DISCONTINUED | OUTPATIENT
Start: 2021-05-14 | End: 2021-05-15 | Stop reason: HOSPADM

## 2021-05-14 RX ORDER — NAPROXEN SODIUM 220 MG/1
324 TABLET, FILM COATED ORAL
Status: COMPLETED | OUTPATIENT
Start: 2021-05-14 | End: 2021-05-14

## 2021-05-14 RX ORDER — SODIUM CHLORIDE 0.9 % (FLUSH) 0.9 %
10 SYRINGE (ML) INJECTION
Status: DISCONTINUED | OUTPATIENT
Start: 2021-05-14 | End: 2021-05-15 | Stop reason: HOSPADM

## 2021-05-14 RX ORDER — ATORVASTATIN CALCIUM 10 MG/1
10 TABLET, FILM COATED ORAL DAILY
Status: DISCONTINUED | OUTPATIENT
Start: 2021-05-15 | End: 2021-05-15

## 2021-05-14 RX ORDER — CYCLOBENZAPRINE HCL 10 MG
TABLET ORAL
COMMUNITY

## 2021-05-14 RX ORDER — LABETALOL 100 MG/1
100 TABLET, FILM COATED ORAL DAILY
Status: DISCONTINUED | OUTPATIENT
Start: 2021-05-15 | End: 2021-05-15 | Stop reason: HOSPADM

## 2021-05-14 RX ORDER — DEXAMETHASONE 4 MG/1
TABLET ORAL
COMMUNITY

## 2021-05-14 RX ORDER — GLUCAGON 1 MG
1 KIT INJECTION
Status: DISCONTINUED | OUTPATIENT
Start: 2021-05-14 | End: 2021-05-15 | Stop reason: HOSPADM

## 2021-05-14 RX ORDER — ONDANSETRON 4 MG/1
8 TABLET, ORALLY DISINTEGRATING ORAL EVERY 8 HOURS PRN
Status: DISCONTINUED | OUTPATIENT
Start: 2021-05-14 | End: 2021-05-15 | Stop reason: HOSPADM

## 2021-05-14 RX ORDER — ALBUTEROL SULFATE 90 UG/1
2 AEROSOL, METERED RESPIRATORY (INHALATION) EVERY 4 HOURS PRN
Status: DISCONTINUED | OUTPATIENT
Start: 2021-05-14 | End: 2021-05-15 | Stop reason: HOSPADM

## 2021-05-14 RX ORDER — IBUPROFEN 200 MG
24 TABLET ORAL
Status: DISCONTINUED | OUTPATIENT
Start: 2021-05-14 | End: 2021-05-15 | Stop reason: HOSPADM

## 2021-05-14 RX ORDER — ENOXAPARIN SODIUM 100 MG/ML
40 INJECTION SUBCUTANEOUS EVERY 24 HOURS
Status: DISCONTINUED | OUTPATIENT
Start: 2021-05-14 | End: 2021-05-15 | Stop reason: HOSPADM

## 2021-05-14 RX ORDER — NAPROXEN SODIUM 220 MG/1
81 TABLET, FILM COATED ORAL DAILY
Status: DISCONTINUED | OUTPATIENT
Start: 2021-05-15 | End: 2021-05-15 | Stop reason: HOSPADM

## 2021-05-14 RX ADMIN — ASPIRIN 324 MG: 81 TABLET, CHEWABLE ORAL at 06:05

## 2021-05-15 ENCOUNTER — CLINICAL SUPPORT (OUTPATIENT)
Dept: CARDIOLOGY | Facility: HOSPITAL | Age: 41
End: 2021-05-15
Payer: COMMERCIAL

## 2021-05-15 VITALS
TEMPERATURE: 99 F | HEIGHT: 65 IN | RESPIRATION RATE: 18 BRPM | BODY MASS INDEX: 33.23 KG/M2 | SYSTOLIC BLOOD PRESSURE: 135 MMHG | OXYGEN SATURATION: 100 % | WEIGHT: 199.44 LBS | DIASTOLIC BLOOD PRESSURE: 75 MMHG | HEART RATE: 86 BPM

## 2021-05-15 PROBLEM — D64.9 NORMOCYTIC ANEMIA: Chronic | Status: ACTIVE | Noted: 2021-05-15

## 2021-05-15 PROBLEM — E66.9 CLASS 1 OBESITY IN ADULT: Chronic | Status: ACTIVE | Noted: 2021-05-15

## 2021-05-15 PROBLEM — I65.21 INTERNAL CAROTID ARTERY STENOSIS, RIGHT: Chronic | Status: ACTIVE | Noted: 2021-05-15

## 2021-05-15 LAB
ALBUMIN SERPL BCP-MCNC: 3.6 G/DL (ref 3.5–5.2)
ALP SERPL-CCNC: 38 U/L (ref 55–135)
ALT SERPL W/O P-5'-P-CCNC: 17 U/L (ref 10–44)
ANION GAP SERPL CALC-SCNC: 8 MMOL/L (ref 8–16)
AORTIC ROOT ANNULUS: 2.83 CM
AORTIC VALVE CUSP SEPERATION: 2.23 CM
AST SERPL-CCNC: 41 U/L (ref 10–40)
AV INDEX (PROSTH): 0.74
AV MEAN GRADIENT: 4 MMHG
AV PEAK GRADIENT: 9 MMHG
AV VALVE AREA: 2.34 CM2
AV VELOCITY RATIO: 68.96
BACTERIA #/AREA URNS HPF: ABNORMAL /HPF
BASOPHILS # BLD AUTO: 0.01 K/UL (ref 0–0.2)
BASOPHILS NFR BLD: 0.2 % (ref 0–1.9)
BILIRUB SERPL-MCNC: 1 MG/DL (ref 0.1–1)
BILIRUB UR QL STRIP: NEGATIVE
BSA FOR ECHO PROCEDURE: 2.04 M2
BUN SERPL-MCNC: 13 MG/DL (ref 6–20)
CALCIUM SERPL-MCNC: 8.8 MG/DL (ref 8.7–10.5)
CHLORIDE SERPL-SCNC: 107 MMOL/L (ref 95–110)
CHOLEST SERPL-MCNC: 135 MG/DL (ref 120–199)
CHOLEST/HDLC SERPL: 3.1 {RATIO} (ref 2–5)
CLARITY UR: CLEAR
CO2 SERPL-SCNC: 22 MMOL/L (ref 23–29)
COLOR UR: YELLOW
CREAT SERPL-MCNC: 0.7 MG/DL (ref 0.5–1.4)
CV ECHO LV RWT: 0.36 CM
DIFFERENTIAL METHOD: ABNORMAL
DOP CALC AO PEAK VEL: 1.48 M/S
DOP CALC AO VTI: 25.78 CM
DOP CALC LVOT AREA: 3.2 CM2
DOP CALC LVOT DIAMETER: 2.01 CM
DOP CALC LVOT PEAK VEL: 102.06 M/S
DOP CALC LVOT STROKE VOLUME: 60.29 CM3
DOP CALCLVOT PEAK VEL VTI: 19.01 CM
E WAVE DECELERATION TIME: 192.39 MSEC
E/A RATIO: 1.13
E/E' RATIO: 9.33 M/S
ECHO LV POSTERIOR WALL: 0.87 CM (ref 0.6–1.1)
EJECTION FRACTION: 70 %
EOSINOPHIL # BLD AUTO: 0.1 K/UL (ref 0–0.5)
EOSINOPHIL NFR BLD: 1.4 % (ref 0–8)
ERYTHROCYTE [DISTWIDTH] IN BLOOD BY AUTOMATED COUNT: 13.5 % (ref 11.5–14.5)
EST. GFR  (AFRICAN AMERICAN): >60 ML/MIN/1.73 M^2
EST. GFR  (NON AFRICAN AMERICAN): >60 ML/MIN/1.73 M^2
ESTIMATED AVG GLUCOSE: 111 MG/DL (ref 68–131)
ESTIMATED AVG GLUCOSE: 111 MG/DL (ref 68–131)
FRACTIONAL SHORTENING: 48 % (ref 28–44)
GLUCOSE SERPL-MCNC: 81 MG/DL (ref 70–110)
GLUCOSE SERPL-MCNC: 84 MG/DL (ref 70–110)
GLUCOSE SERPL-MCNC: 90 MG/DL (ref 70–110)
GLUCOSE SERPL-MCNC: 92 MG/DL (ref 70–110)
GLUCOSE UR QL STRIP: NEGATIVE
HBA1C MFR BLD: 5.5 % (ref 4.5–6.2)
HBA1C MFR BLD: 5.5 % (ref 4.5–6.2)
HCT VFR BLD AUTO: 33.8 % (ref 37–48.5)
HDLC SERPL-MCNC: 44 MG/DL (ref 40–75)
HDLC SERPL: 32.6 % (ref 20–50)
HGB BLD-MCNC: 10.9 G/DL (ref 12–16)
HGB UR QL STRIP: NEGATIVE
HYALINE CASTS #/AREA URNS LPF: 16 /LPF
IMM GRANULOCYTES # BLD AUTO: 0.01 K/UL (ref 0–0.04)
IMM GRANULOCYTES NFR BLD AUTO: 0.2 % (ref 0–0.5)
INTERVENTRICULAR SEPTUM: 0.87 CM (ref 0.6–1.1)
IVRT: 53.87 MSEC
KETONES UR QL STRIP: ABNORMAL
LDLC SERPL CALC-MCNC: 82.8 MG/DL (ref 63–159)
LEFT ATRIUM SIZE: 3.63 CM
LEFT INTERNAL DIMENSION IN SYSTOLE: 2.49 CM (ref 2.1–4)
LEFT VENTRICLE DIASTOLIC VOLUME INDEX: 74.61 ML/M2
LEFT VENTRICLE DIASTOLIC VOLUME: 147.72 ML
LEFT VENTRICLE MASS INDEX: 72 G/M2
LEFT VENTRICLE SYSTOLIC VOLUME INDEX: 31.3 ML/M2
LEFT VENTRICLE SYSTOLIC VOLUME: 61.94 ML
LEFT VENTRICULAR INTERNAL DIMENSION IN DIASTOLE: 4.81 CM (ref 3.5–6)
LEFT VENTRICULAR MASS: 141.81 G
LEUKOCYTE ESTERASE UR QL STRIP: ABNORMAL
LV LATERAL E/E' RATIO: 9.8 M/S
LV SEPTAL E/E' RATIO: 8.91 M/S
LYMPHOCYTES # BLD AUTO: 2.6 K/UL (ref 1–4.8)
LYMPHOCYTES NFR BLD: 52.3 % (ref 18–48)
MAGNESIUM SERPL-MCNC: 1.9 MG/DL (ref 1.6–2.6)
MCH RBC QN AUTO: 26.7 PG (ref 27–31)
MCHC RBC AUTO-ENTMCNC: 32.2 G/DL (ref 32–36)
MCV RBC AUTO: 83 FL (ref 82–98)
MICROSCOPIC COMMENT: ABNORMAL
MONOCYTES # BLD AUTO: 0.6 K/UL (ref 0.3–1)
MONOCYTES NFR BLD: 11.6 % (ref 4–15)
MV PEAK A VEL: 0.87 M/S
MV PEAK E VEL: 0.98 M/S
NEUTROPHILS # BLD AUTO: 1.7 K/UL (ref 1.8–7.7)
NEUTROPHILS NFR BLD: 34.3 % (ref 38–73)
NITRITE UR QL STRIP: NEGATIVE
NONHDLC SERPL-MCNC: 91 MG/DL
NRBC BLD-RTO: 0 /100 WBC
PH UR STRIP: 6 [PH] (ref 5–8)
PHOSPHATE SERPL-MCNC: 3.2 MG/DL (ref 2.7–4.5)
PLATELET # BLD AUTO: 269 K/UL (ref 150–450)
PMV BLD AUTO: 9.9 FL (ref 9.2–12.9)
POTASSIUM SERPL-SCNC: 3.8 MMOL/L (ref 3.5–5.1)
PROT SERPL-MCNC: 6.9 G/DL (ref 6–8.4)
PROT UR QL STRIP: ABNORMAL
PV PEAK VELOCITY: 79.44 CM/S
RA PRESSURE: 3 MMHG
RBC # BLD AUTO: 4.08 M/UL (ref 4–5.4)
RBC #/AREA URNS HPF: 2 /HPF (ref 0–4)
RIGHT VENTRICULAR END-DIASTOLIC DIMENSION: 243 CM
SODIUM SERPL-SCNC: 137 MMOL/L (ref 136–145)
SP GR UR STRIP: >1.03 (ref 1–1.03)
SQUAMOUS #/AREA URNS HPF: 5 /HPF
TDI LATERAL: 0.1 M/S
TDI SEPTAL: 0.11 M/S
TDI: 0.11 M/S
TRIGL SERPL-MCNC: 41 MG/DL (ref 30–150)
TROPONIN I SERPL DL<=0.01 NG/ML-MCNC: <0.03 NG/ML
URN SPEC COLLECT METH UR: ABNORMAL
UROBILINOGEN UR STRIP-ACNC: NEGATIVE EU/DL
WBC # BLD AUTO: 5.01 K/UL (ref 3.9–12.7)
WBC #/AREA URNS HPF: 25 /HPF (ref 0–5)

## 2021-05-15 PROCEDURE — 83036 HEMOGLOBIN GLYCOSYLATED A1C: CPT | Performed by: NURSE PRACTITIONER

## 2021-05-15 PROCEDURE — 96374 THER/PROPH/DIAG INJ IV PUSH: CPT | Mod: 59

## 2021-05-15 PROCEDURE — 93306 ECHO (CUPID ONLY): ICD-10-PCS | Mod: 26,,, | Performed by: INTERNAL MEDICINE

## 2021-05-15 PROCEDURE — 84100 ASSAY OF PHOSPHORUS: CPT | Performed by: NURSE PRACTITIONER

## 2021-05-15 PROCEDURE — 83735 ASSAY OF MAGNESIUM: CPT | Performed by: NURSE PRACTITIONER

## 2021-05-15 PROCEDURE — 63600175 PHARM REV CODE 636 W HCPCS: Performed by: NURSE PRACTITIONER

## 2021-05-15 PROCEDURE — 99900031 HC PATIENT EDUCATION (STAT)

## 2021-05-15 PROCEDURE — 87086 URINE CULTURE/COLONY COUNT: CPT | Performed by: NURSE PRACTITIONER

## 2021-05-15 PROCEDURE — 92610 EVALUATE SWALLOWING FUNCTION: CPT

## 2021-05-15 PROCEDURE — 84484 ASSAY OF TROPONIN QUANT: CPT | Mod: 91 | Performed by: NURSE PRACTITIONER

## 2021-05-15 PROCEDURE — 25000242 PHARM REV CODE 250 ALT 637 W/ HCPCS: Performed by: NURSE PRACTITIONER

## 2021-05-15 PROCEDURE — 25500020 PHARM REV CODE 255: Performed by: INTERNAL MEDICINE

## 2021-05-15 PROCEDURE — 63600175 PHARM REV CODE 636 W HCPCS: Performed by: INTERNAL MEDICINE

## 2021-05-15 PROCEDURE — 97165 OT EVAL LOW COMPLEX 30 MIN: CPT

## 2021-05-15 PROCEDURE — 97161 PT EVAL LOW COMPLEX 20 MIN: CPT

## 2021-05-15 PROCEDURE — 99900035 HC TECH TIME PER 15 MIN (STAT)

## 2021-05-15 PROCEDURE — 80053 COMPREHEN METABOLIC PANEL: CPT | Performed by: NURSE PRACTITIONER

## 2021-05-15 PROCEDURE — 93306 TTE W/DOPPLER COMPLETE: CPT

## 2021-05-15 PROCEDURE — G0378 HOSPITAL OBSERVATION PER HR: HCPCS

## 2021-05-15 PROCEDURE — 97535 SELF CARE MNGMENT TRAINING: CPT

## 2021-05-15 PROCEDURE — 92523 SPEECH SOUND LANG COMPREHEN: CPT

## 2021-05-15 PROCEDURE — A9585 GADOBUTROL INJECTION: HCPCS | Performed by: INTERNAL MEDICINE

## 2021-05-15 PROCEDURE — 93306 TTE W/DOPPLER COMPLETE: CPT | Mod: 26,,, | Performed by: INTERNAL MEDICINE

## 2021-05-15 PROCEDURE — 94640 AIRWAY INHALATION TREATMENT: CPT

## 2021-05-15 PROCEDURE — 97116 GAIT TRAINING THERAPY: CPT

## 2021-05-15 PROCEDURE — 94761 N-INVAS EAR/PLS OXIMETRY MLT: CPT

## 2021-05-15 PROCEDURE — 85025 COMPLETE CBC W/AUTO DIFF WBC: CPT | Performed by: NURSE PRACTITIONER

## 2021-05-15 PROCEDURE — 36415 COLL VENOUS BLD VENIPUNCTURE: CPT | Performed by: NURSE PRACTITIONER

## 2021-05-15 PROCEDURE — 80061 LIPID PANEL: CPT | Performed by: NURSE PRACTITIONER

## 2021-05-15 PROCEDURE — 96372 THER/PROPH/DIAG INJ SC/IM: CPT | Mod: 59

## 2021-05-15 PROCEDURE — 25000003 PHARM REV CODE 250: Performed by: NURSE PRACTITIONER

## 2021-05-15 PROCEDURE — 81001 URINALYSIS AUTO W/SCOPE: CPT | Performed by: NURSE PRACTITIONER

## 2021-05-15 PROCEDURE — 97530 THERAPEUTIC ACTIVITIES: CPT

## 2021-05-15 RX ORDER — ATORVASTATIN CALCIUM 40 MG/1
40 TABLET, FILM COATED ORAL NIGHTLY
Status: DISCONTINUED | OUTPATIENT
Start: 2021-05-16 | End: 2021-05-15 | Stop reason: HOSPADM

## 2021-05-15 RX ORDER — ATORVASTATIN CALCIUM 40 MG/1
40 TABLET, FILM COATED ORAL NIGHTLY
Status: DISCONTINUED | OUTPATIENT
Start: 2021-05-15 | End: 2021-05-15

## 2021-05-15 RX ORDER — POTASSIUM CHLORIDE 20 MEQ/1
20 TABLET, EXTENDED RELEASE ORAL
Status: DISCONTINUED | OUTPATIENT
Start: 2021-05-15 | End: 2021-05-15 | Stop reason: HOSPADM

## 2021-05-15 RX ORDER — MAGNESIUM SULFATE 1 G/100ML
1 INJECTION INTRAVENOUS
Status: DISCONTINUED | OUTPATIENT
Start: 2021-05-15 | End: 2021-05-15 | Stop reason: HOSPADM

## 2021-05-15 RX ORDER — POTASSIUM CHLORIDE 20 MEQ/1
40 TABLET, EXTENDED RELEASE ORAL
Status: DISCONTINUED | OUTPATIENT
Start: 2021-05-15 | End: 2021-05-15 | Stop reason: HOSPADM

## 2021-05-15 RX ORDER — POTASSIUM CHLORIDE 7.45 MG/ML
40 INJECTION INTRAVENOUS
Status: DISCONTINUED | OUTPATIENT
Start: 2021-05-15 | End: 2021-05-15 | Stop reason: HOSPADM

## 2021-05-15 RX ORDER — LANOLIN ALCOHOL/MO/W.PET/CERES
800 CREAM (GRAM) TOPICAL
Status: DISCONTINUED | OUTPATIENT
Start: 2021-05-15 | End: 2021-05-15 | Stop reason: HOSPADM

## 2021-05-15 RX ORDER — ATORVASTATIN CALCIUM 40 MG/1
40 TABLET, FILM COATED ORAL NIGHTLY
Qty: 30 TABLET | Refills: 0 | Status: SHIPPED | OUTPATIENT
Start: 2021-05-16 | End: 2021-06-15

## 2021-05-15 RX ORDER — POTASSIUM CHLORIDE 7.45 MG/ML
20 INJECTION INTRAVENOUS
Status: DISCONTINUED | OUTPATIENT
Start: 2021-05-15 | End: 2021-05-15 | Stop reason: HOSPADM

## 2021-05-15 RX ORDER — GADOBUTROL 604.72 MG/ML
7 INJECTION INTRAVENOUS
Status: COMPLETED | OUTPATIENT
Start: 2021-05-15 | End: 2021-05-15

## 2021-05-15 RX ORDER — MAGNESIUM SULFATE HEPTAHYDRATE 40 MG/ML
2 INJECTION, SOLUTION INTRAVENOUS
Status: DISCONTINUED | OUTPATIENT
Start: 2021-05-15 | End: 2021-05-15 | Stop reason: HOSPADM

## 2021-05-15 RX ORDER — MAGNESIUM SULFATE HEPTAHYDRATE 40 MG/ML
4 INJECTION, SOLUTION INTRAVENOUS
Status: DISCONTINUED | OUTPATIENT
Start: 2021-05-15 | End: 2021-05-15 | Stop reason: HOSPADM

## 2021-05-15 RX ADMIN — CETIRIZINE HYDROCHLORIDE 10 MG: 10 TABLET, FILM COATED ORAL at 10:05

## 2021-05-15 RX ADMIN — ATORVASTATIN CALCIUM 10 MG: 10 TABLET, FILM COATED ORAL at 10:05

## 2021-05-15 RX ADMIN — THERA TABS 1 TABLET: TAB at 10:05

## 2021-05-15 RX ADMIN — ENOXAPARIN SODIUM 40 MG: 40 INJECTION SUBCUTANEOUS at 12:05

## 2021-05-15 RX ADMIN — GADOBUTROL 7 ML: 604.72 INJECTION INTRAVENOUS at 05:05

## 2021-05-15 RX ADMIN — LABETALOL HYDROCHLORIDE 100 MG: 100 TABLET, FILM COATED ORAL at 10:05

## 2021-05-15 RX ADMIN — FLUTICASONE FUROATE 1 PUFF: 100 POWDER RESPIRATORY (INHALATION) at 08:05

## 2021-05-15 RX ADMIN — ENOXAPARIN SODIUM 40 MG: 40 INJECTION SUBCUTANEOUS at 06:05

## 2021-05-15 RX ADMIN — ASPIRIN 81 MG: 81 TABLET, CHEWABLE ORAL at 10:05

## 2021-05-15 RX ADMIN — CEFTRIAXONE 1 G: 1 INJECTION, SOLUTION INTRAVENOUS at 10:05

## 2021-05-17 LAB
BACTERIA UR CULT: NORMAL
BACTERIA UR CULT: NORMAL

## 2021-05-31 DIAGNOSIS — I65.29 INTERNAL CAROTID ARTERY STENOSIS: Primary | ICD-10-CM

## 2021-06-01 ENCOUNTER — TELEPHONE (OUTPATIENT)
Dept: VASCULAR SURGERY | Facility: CLINIC | Age: 41
End: 2021-06-01

## 2021-06-01 DIAGNOSIS — I65.21 INTERNAL CAROTID ARTERY STENOSIS, RIGHT: Primary | ICD-10-CM

## 2021-06-09 ENCOUNTER — PATIENT MESSAGE (OUTPATIENT)
Dept: VASCULAR SURGERY | Facility: CLINIC | Age: 41
End: 2021-06-09

## 2021-06-25 ENCOUNTER — HOSPITAL ENCOUNTER (OUTPATIENT)
Dept: VASCULAR SURGERY | Facility: CLINIC | Age: 41
Discharge: HOME | End: 2021-06-25
Attending: SURGERY
Payer: COMMERCIAL

## 2021-06-25 ENCOUNTER — INITIAL CONSULT (OUTPATIENT)
Dept: VASCULAR SURGERY | Facility: CLINIC | Age: 41
End: 2021-06-25
Attending: SURGERY
Payer: COMMERCIAL

## 2021-06-25 VITALS
DIASTOLIC BLOOD PRESSURE: 89 MMHG | HEART RATE: 68 BPM | HEIGHT: 65 IN | SYSTOLIC BLOOD PRESSURE: 171 MMHG | TEMPERATURE: 98 F | BODY MASS INDEX: 33.79 KG/M2 | WEIGHT: 202.81 LBS

## 2021-06-25 DIAGNOSIS — I65.21 INTERNAL CAROTID ARTERY STENOSIS, RIGHT: ICD-10-CM

## 2021-06-25 DIAGNOSIS — I65.21 INTERNAL CAROTID ARTERY STENOSIS, RIGHT: Primary | ICD-10-CM

## 2021-06-25 PROCEDURE — 99999 PR PBB SHADOW E&M-EST. PATIENT-LVL IV: ICD-10-PCS | Mod: PBBFAC,,, | Performed by: SURGERY

## 2021-06-25 PROCEDURE — 99203 PR OFFICE/OUTPT VISIT, NEW, LEVL III, 30-44 MIN: ICD-10-PCS | Mod: S$GLB,,, | Performed by: SURGERY

## 2021-06-25 PROCEDURE — 99999 PR PBB SHADOW E&M-EST. PATIENT-LVL IV: CPT | Mod: PBBFAC,,, | Performed by: SURGERY

## 2021-06-25 PROCEDURE — 93880 EXTRACRANIAL BILAT STUDY: CPT | Mod: S$GLB,,, | Performed by: SURGERY

## 2021-06-25 PROCEDURE — 93880 PR DUPLEX SCAN EXTRACRANIAL,BILAT: ICD-10-PCS | Mod: S$GLB,,, | Performed by: SURGERY

## 2021-06-25 PROCEDURE — 99203 OFFICE O/P NEW LOW 30 MIN: CPT | Mod: S$GLB,,, | Performed by: SURGERY

## 2021-07-02 ENCOUNTER — TELEPHONE (OUTPATIENT)
Dept: VASCULAR SURGERY | Facility: HOSPITAL | Age: 41
End: 2021-07-02

## 2021-07-02 ENCOUNTER — HOSPITAL ENCOUNTER (OUTPATIENT)
Dept: RADIOLOGY | Facility: HOSPITAL | Age: 41
Discharge: HOME | End: 2021-07-02
Attending: SURGERY
Payer: COMMERCIAL

## 2021-07-02 DIAGNOSIS — I65.21 INTERNAL CAROTID ARTERY STENOSIS, RIGHT: ICD-10-CM

## 2021-07-02 LAB
CREAT SERPL-MCNC: 0.6 MG/DL (ref 0.5–1.4)
SAMPLE: NORMAL

## 2021-07-02 PROCEDURE — 70498 CT ANGIOGRAPHY NECK: CPT | Mod: 26,,, | Performed by: RADIOLOGY

## 2021-07-02 PROCEDURE — 70498 CT ANGIOGRAPHY NECK: CPT | Mod: TC

## 2021-07-02 PROCEDURE — 70498 CTA NECK: ICD-10-PCS | Mod: 26,,, | Performed by: RADIOLOGY

## 2021-07-02 PROCEDURE — 25500020 PHARM REV CODE 255: Performed by: SURGERY

## 2021-07-02 RX ADMIN — IOHEXOL 75 ML: 350 INJECTION, SOLUTION INTRAVENOUS at 09:07

## 2024-02-05 ENCOUNTER — OFFICE VISIT (OUTPATIENT)
Dept: URGENT CARE | Facility: CLINIC | Age: 44
End: 2024-02-05
Payer: COMMERCIAL

## 2024-02-05 VITALS
DIASTOLIC BLOOD PRESSURE: 100 MMHG | TEMPERATURE: 98 F | HEIGHT: 65 IN | WEIGHT: 202 LBS | HEART RATE: 87 BPM | RESPIRATION RATE: 18 BRPM | BODY MASS INDEX: 33.66 KG/M2 | SYSTOLIC BLOOD PRESSURE: 167 MMHG | OXYGEN SATURATION: 98 %

## 2024-02-05 DIAGNOSIS — Z76.0 MEDICATION REFILL: ICD-10-CM

## 2024-02-05 DIAGNOSIS — R10.9 FLANK PAIN: ICD-10-CM

## 2024-02-05 DIAGNOSIS — T36.95XA ANTIBIOTIC-INDUCED YEAST INFECTION: ICD-10-CM

## 2024-02-05 DIAGNOSIS — B37.9 ANTIBIOTIC-INDUCED YEAST INFECTION: ICD-10-CM

## 2024-02-05 DIAGNOSIS — N12 PYELONEPHRITIS: Primary | ICD-10-CM

## 2024-02-05 PROBLEM — D35.2 PITUITARY MICROADENOMA: Status: ACTIVE | Noted: 2018-05-15

## 2024-02-05 PROBLEM — N21.0 URINARY BLADDER STONE: Status: ACTIVE | Noted: 2024-02-05

## 2024-02-05 PROBLEM — G62.9 NEUROPATHY: Status: ACTIVE | Noted: 2024-02-05

## 2024-02-05 PROBLEM — F32.3: Status: ACTIVE | Noted: 2022-09-15

## 2024-02-05 PROBLEM — N39.0 URINARY TRACT INFECTIOUS DISEASE: Status: ACTIVE | Noted: 2024-02-05

## 2024-02-05 LAB
B-HCG UR QL: NEGATIVE
BILIRUB UR QL STRIP: NEGATIVE
CTP QC/QA: YES
GLUCOSE UR QL STRIP: NEGATIVE
KETONES UR QL STRIP: NEGATIVE
LEUKOCYTE ESTERASE UR QL STRIP: NEGATIVE
PH, POC UA: 5
POC BLOOD, URINE: POSITIVE
POC NITRATES, URINE: NEGATIVE
PROT UR QL STRIP: NEGATIVE
SP GR UR STRIP: 1.02 (ref 1–1.03)
UROBILINOGEN UR STRIP-ACNC: ABNORMAL (ref 0.1–1.1)

## 2024-02-05 PROCEDURE — 81003 URINALYSIS AUTO W/O SCOPE: CPT | Mod: QW,S$GLB,, | Performed by: FAMILY MEDICINE

## 2024-02-05 PROCEDURE — 87086 URINE CULTURE/COLONY COUNT: CPT | Performed by: FAMILY MEDICINE

## 2024-02-05 PROCEDURE — 81025 URINE PREGNANCY TEST: CPT | Mod: S$GLB,,, | Performed by: FAMILY MEDICINE

## 2024-02-05 PROCEDURE — 99204 OFFICE O/P NEW MOD 45 MIN: CPT | Mod: S$GLB,,, | Performed by: FAMILY MEDICINE

## 2024-02-05 RX ORDER — CIPROFLOXACIN 500 MG/1
500 TABLET ORAL EVERY 12 HOURS
Qty: 14 TABLET | Refills: 0 | Status: SHIPPED | OUTPATIENT
Start: 2024-02-05 | End: 2024-02-12

## 2024-02-05 RX ORDER — FLUCONAZOLE 150 MG/1
TABLET ORAL
Qty: 2 TABLET | Refills: 0 | Status: SHIPPED | OUTPATIENT
Start: 2024-02-05

## 2024-02-05 RX ORDER — LABETALOL 100 MG/1
TABLET, FILM COATED ORAL
Qty: 30 TABLET | Refills: 0 | Status: SHIPPED | OUTPATIENT
Start: 2024-02-05

## 2024-02-05 RX ORDER — MELOXICAM 15 MG/1
TABLET ORAL
COMMUNITY

## 2024-02-05 RX ORDER — HYDROCHLOROTHIAZIDE 12.5 MG/1
TABLET ORAL
COMMUNITY

## 2024-02-05 NOTE — PATIENT INSTRUCTIONS
General Discharge Instructions   PLEASE READ YOUR DISCHARGE INSTRUCTIONS ENTIRELY AS IT CONTAINS IMPORTANT INFORMATION.  If you were prescribed a narcotic or controlled medication, do not drive or operate heavy equipment or machinery while taking these medications.  If you were prescribed antibiotics, please take them to completion.  You must understand that you've received an Urgent Care treatment only and that you may be released before all your medical problems are known or treated. You, the patient, will arrange for follow up care as instructed.    OVER THE COUNTER RECOMMENDATIONS/SUGGESTIONS.    Make sure to stay well hydrated.    Use Nasal Saline to mechanically move any post nasal drip from your eustachian tube or from the back of your throat.    Use warm salt water gargles to ease your throat pain. Warm salt water gargles as needed for sore throat- 1/2 tsp salt to 1 cup warm water, gargle as desired.    Use an antihistamine such as Claritin, Zyrtec or Allegra to dry you out.    Use pseudoephedrine (behind the counter) to decongest. Pseudoephedrine 30 mg up to 240 mg /day. It can raise your blood pressure and give you palpitations.    Use mucinex (guaifenesin) to break up mucous up to 2400mg/day to loosen any mucous.    The mucinex DM pill has a cough suppressant that can be sedating. It can be used at night to stop the tickle at the back of your throat.    You can use Mucinex D (it has guaifenesin and a high dose of pseudoephedrine) in the mornings to help decongest.    Use Afrin in each nare for no longer than 3 days, as it is addictive. It can also dry out your mucous membranes and cause elevated blood pressure. This is especially useful if you are flying.    Use Flonase 1-2 sprays/nostril per day. It is a local acting steroid nasal spray, if you develop a bloody nose, stop using the medication immediately.    Sometimes Nyquil at night is beneficial to help you get some rest, however it is sedating and it  does have an antihistamine, and tylenol.    Honey is a natural cough suppressant that can be used.    Tylenol up to 4,000 mg a day is safe for short periods and can be used for body aches, pain, and fever. However in high doses and prolonged use it can cause liver irritation.    Ibuprofen is a non-steroidal anti-inflammatory that can be used for body aches, pain, and fever.However it can also cause stomach irritation if over used.     Follow up with your PCP or specialty clinic as instructed in the next 2-3 days if not improved or as needed. You can call [phone] to schedule an appointment with appropriate provider.      If you condition worsens, we recommend that you receive another evaluation at the emergency room immediately or contact your primary medical clinic's after hours call service to discuss your concerns.      Please return here or go to the Emergency Department for any concerns or worsening condition.   You can also call [phone] to schedule an appointment with the appropriate provider.    Please return here or go to the Emergency Department for any concerns or worsening of condition.    Thank you for choosing [facility]!    Our goal in the Urgent Care is to always provide outstanding medical care. You may receive a survey by mail or e-mail in the next week regarding your experience today. We would greatly appreciate you completing and returning the survey. Your feedback provides us with a way to recognize our staff who provide very good care, and it helps us learn how to improve when your experience was below our aspiration of excellence.      We appreciate you trusting us with your medical care. We hope you feel better soon. We will be happy to take care of you for all of your future medical needs.    Sincerely,    ANGE Begum  PLEASE READ YOUR DISCHARGE INSTRUCTIONS ENTIRELY AS IT CONTAINS IMPORTANT INFORMATION.      Take the antibiotics to completion.     Drink plenty  of fluids, wipe front to back, take showers not baths, no scented soaps, wear breathable cotton underwear, urinate after sexual intercourse.     A urine culture was sent. You will be contacted once it results and appropriate action will be taken if needed.     Take the pyridium three times a day with meals. It will turn your urine orange. You do not need to take the whole prescription you can stop this once the pain is better and finish out the antibiotics    Please go to the ER for worsening symptoms including fever, worsening flank pain, vomiting, etc.       Please return or see your primary care doctor if you develop new or worsening symptoms.     Please arrange follow up with your primary medical clinic as soon as possible. You must understand that you've received an Urgent Care treatment only and that you may be released before all of your medical problems are known or treated. You, the patient, will arrange for follow up as instructed. If your symptoms worsen or fail to improve you should go to the Emergency Room.  WE CANNOT RULE OUT ALL POSSIBLE CAUSES OF YOUR SYMPTOMS IN THE URGENT CARE SETTING PLEASE GO TO THE ER IF YOU FEELS YOUR CONDITION IS WORSENING OR YOU WOULD LIKE EMERGENT EVALUATION.

## 2024-02-05 NOTE — PROGRESS NOTES
"Subjective:      Patient ID: Nelida Prado is a 43 y.o. female.    Vitals:  height is 5' 5" (1.651 m) and weight is 91.6 kg (202 lb). Her oral temperature is 98.3 °F (36.8 °C). Her blood pressure is 167/100 (abnormal) and her pulse is 87. Her respiration is 18 and oxygen saturation is 98%.     Chief Complaint: Flank Pain    Pt states that she is having right flank pain and burning while urination for about 1 week now .   Provider note begins below:  Past Medical History:  No date: Asthma  No date: Leiomyoma      Comment:  uterus  No date: Obesity  No date: Seasonal allergies   Pt with above pmh she has isreal flank pain for about 2 weeks. She denies any fever or chills. She is unsure if she has irritation from using a garcia product recently. She does also have hx of kidney stones, but reports pain was more severe at that time. She says she noticed some bleeding today. She denies any urinary retention, urgency, frequency. She denies any concern for STD, she is not currently sexually active. Denies any rashes or lesions, denies any vaginal discharge. Bp elevated, she missed her bp meds today, admits noncompliance with bp medications. She is est with pcp. Denies recurrent UTIs. She denies any emesis.  Patient reports she needs a refill of her labetalol.    Flank Pain  This is a new problem. The current episode started in the past 7 days. The problem occurs constantly. The problem is unchanged. The quality of the pain is described as burning. The pain does not radiate. The pain is at a severity of 9/10. The pain is severe. Associated symptoms include dysuria. Pertinent negatives include no abdominal pain, bladder incontinence, bowel incontinence, chest pain, fever, headaches, leg pain, numbness, paresis, paresthesias, pelvic pain, perianal numbness, tingling, weakness or weight loss. She has tried nothing for the symptoms.   Dictation #1  MRN:[id number]  [facility]:[id number]     Constitution: Negative for activity change, " appetite change, chills, sweating, fatigue, fever and unexpected weight change.   Cardiovascular:  Negative for chest pain.   Gastrointestinal:  Negative for abdominal pain and bowel incontinence.   Genitourinary:  Positive for dysuria, flank pain, hematuria and history of kidney stones. Negative for frequency, urgency, urine decreased, bladder incontinence, bed wetting, painful menstruation, irregular menstruation, missed menses, heavy menstrual bleeding, ovarian cysts, genital trauma, vaginal pain, vaginal discharge, vaginal bleeding, vaginal odor, painful intercourse, genital sore and pelvic pain.   Neurological:  Negative for headaches and numbness.      Objective:     Physical Exam   Constitutional: She is oriented to person, place, and time. She appears well-developed.  Non-toxic appearance. She does not appear ill. No distress. overweight  HENT:   Head: Normocephalic and atraumatic.   Ears:   Right Ear: External ear normal.   Left Ear: External ear normal.   Nose: Nose normal.   Mouth/Throat: Oropharynx is clear and moist.   Eyes: Conjunctivae, EOM and lids are normal. Pupils are equal, round, and reactive to light.   Neck: Trachea normal and phonation normal. Neck supple.   Abdominal: Soft. There is left CVA tenderness and right CVA tenderness. There is no rebound, no guarding, no tenderness at McBurney's point, negative Soto's sign, negative Rovsing's sign, negative psoas sign and negative obturator sign.   Musculoskeletal: Normal range of motion.         General: Normal range of motion.   Neurological: She is alert and oriented to person, place, and time.   Skin: Skin is warm, dry, intact and not diaphoretic.   Psychiatric: Her speech is normal and behavior is normal. Judgment and thought content normal.   Nursing note and vitals reviewed.      Assessment:     1. Pyelonephritis    2. Flank pain    3. Antibiotic-induced yeast infection    4. Medication refill      Results for orders placed or performed in  visit on 02/05/24   POCT Urinalysis, Dipstick, Automated, W/O Scope   Result Value Ref Range    POC Blood, Urine Positive (A) Negative    POC Bilirubin, Urine Negative Negative    POC Urobilinogen, Urine norm 0.1 - 1.1    POC Ketones, Urine Negative Negative    POC Protein, Urine Negative Negative    POC Nitrates, Urine Negative Negative    POC Glucose, Urine Negative Negative    pH, UA 5.0     POC Specific Gravity, Urine 1.020 1.003 - 1.029    POC Leukocytes, Urine Negative Negative   POCT urine pregnancy   Result Value Ref Range    POC Preg Test, Ur Negative Negative     Acceptable Yes       Plan:     Pt afebrile, she does have history of kidney stones I have provided her with a strainer and advised to use this and collect stone if needed.  Also encouraged her to follow-up with her PCP in 4-5 days for re-evaluation of symptoms.  Patient with bilateral CVA tenderness.  Urine culture collected, she has tolerated cipro in the past, hx of prolonged QT.  Refilled labetolol.     Medication list reviewed and no apparent potential drug-drug interactions of concern. No documented history of AAA.    Patient's counseled regarding possible adverse effects of ciprofloxacin ( including but not limited to qtc prolongation, hypoglycemia, achilles tendonitis/ rupture, worsening of aneurysms, c.diff).      Discussed results/diagnosis/plan with patient in clinic. Strict precautions given to patient to monitor for worsening signs and symptoms. Advised to follow up with PCP or specialist.    Explained side effects of medications prescribed with patient and informed him/her to discontinue use if he/she has any side effects and to inform UC or PCP if this occurs. All questions answered. Strict ED verses clinic return precautions stressed and given in depth. Advised if symptoms worsens of fail to improve he/she should go to the Emergency Room. Discharge and follow-up instructions given verbally/printed with the patient who  expressed understanding and willingness to comply with my recommendations. Patient voiced understanding and in agreement with current treatment plan. Patient exits the exam room in no acute distress. Conversant and engaged during discharge discussion, verbalized understanding.      Pyelonephritis  -     ciprofloxacin HCl (CIPRO) 500 MG tablet; Take 1 tablet (500 mg total) by mouth every 12 (twelve) hours. for 7 days  Dispense: 14 tablet; Refill: 0  -     CULTURE, URINE    Flank pain  -     POCT Urinalysis, Dipstick, Automated, W/O Scope  -     POCT urine pregnancy    Antibiotic-induced yeast infection  -     fluconazole (DIFLUCAN) 150 MG Tab; Take 1 pill today, if not better take a 2nd pill in 72 hours.  Dispense: 2 tablet; Refill: 0    Medication refill  -     labetaloL (NORMODYNE) 100 MG tablet; labetalol 100 mg tablet  Dispense: 30 tablet; Refill: 0          Medical Decision Making:   History:   Old Medical Records: I decided to obtain old medical records.  Old Records Summarized: records from clinic visits and other records.  Clinical Tests:   Lab Tests: Ordered and Reviewed  The following lab test(s) were unremarkable: UPT and Urinalysis       <> Summary of Lab: Urine culture pending       Patient Instructions   General Discharge Instructions   PLEASE READ YOUR DISCHARGE INSTRUCTIONS ENTIRELY AS IT CONTAINS IMPORTANT INFORMATION.  If you were prescribed a narcotic or controlled medication, do not drive or operate heavy equipment or machinery while taking these medications.  If you were prescribed antibiotics, please take them to completion.  You must understand that you've received an Urgent Care treatment only and that you may be released before all your medical problems are known or treated. You, the patient, will arrange for follow up care as instructed.    OVER THE COUNTER RECOMMENDATIONS/SUGGESTIONS.    Make sure to stay well hydrated.    Use Nasal Saline to mechanically move any post nasal drip from your  eustachian tube or from the back of your throat.    Use warm salt water gargles to ease your throat pain. Warm salt water gargles as needed for sore throat- 1/2 tsp salt to 1 cup warm water, gargle as desired.    Use an antihistamine such as Claritin, Zyrtec or Allegra to dry you out.    Use pseudoephedrine (behind the counter) to decongest. Pseudoephedrine 30 mg up to 240 mg /day. It can raise your blood pressure and give you palpitations.    Use mucinex (guaifenesin) to break up mucous up to 2400mg/day to loosen any mucous.    The mucinex DM pill has a cough suppressant that can be sedating. It can be used at night to stop the tickle at the back of your throat.    You can use Mucinex D (it has guaifenesin and a high dose of pseudoephedrine) in the mornings to help decongest.    Use Afrin in each nare for no longer than 3 days, as it is addictive. It can also dry out your mucous membranes and cause elevated blood pressure. This is especially useful if you are flying.    Use Flonase 1-2 sprays/nostril per day. It is a local acting steroid nasal spray, if you develop a bloody nose, stop using the medication immediately.    Sometimes Nyquil at night is beneficial to help you get some rest, however it is sedating and it does have an antihistamine, and tylenol.    Honey is a natural cough suppressant that can be used.    Tylenol up to 4,000 mg a day is safe for short periods and can be used for body aches, pain, and fever. However in high doses and prolonged use it can cause liver irritation.    Ibuprofen is a non-steroidal anti-inflammatory that can be used for body aches, pain, and fever.However it can also cause stomach irritation if over used.     Follow up with your PCP or specialty clinic as instructed in the next 2-3 days if not improved or as needed. You can call [phone] to schedule an appointment with appropriate provider.      If you condition worsens, we recommend that you receive another evaluation at  the emergency room immediately or contact your primary medical clinic's after hours call service to discuss your concerns.      Please return here or go to the Emergency Department for any concerns or worsening condition.   You can also call [phone] to schedule an appointment with the appropriate provider.    Please return here or go to the Emergency Department for any concerns or worsening of condition.    Thank you for choosing [facility]!    Our goal in the Urgent Care is to always provide outstanding medical care. You may receive a survey by mail or e-mail in the next week regarding your experience today. We would greatly appreciate you completing and returning the survey. Your feedback provides us with a way to recognize our staff who provide very good care, and it helps us learn how to improve when your experience was below our aspiration of excellence.      We appreciate you trusting us with your medical care. We hope you feel better soon. We will be happy to take care of you for all of your future medical needs.    Sincerely,    ANGE Begum  PLEASE READ YOUR DISCHARGE INSTRUCTIONS ENTIRELY AS IT CONTAINS IMPORTANT INFORMATION.      Take the antibiotics to completion.     Drink plenty of fluids, wipe front to back, take showers not baths, no scented soaps, wear breathable cotton underwear, urinate after sexual intercourse.     A urine culture was sent. You will be contacted once it results and appropriate action will be taken if needed.     Take the pyridium three times a day with meals. It will turn your urine orange. You do not need to take the whole prescription you can stop this once the pain is better and finish out the antibiotics    Please go to the ER for worsening symptoms including fever, worsening flank pain, vomiting, etc.       Please return or see your primary care doctor if you develop new or worsening symptoms.     Please arrange follow up with your primary medical  clinic as soon as possible. You must understand that you've received an Urgent Care treatment only and that you may be released before all of your medical problems are known or treated. You, the patient, will arrange for follow up as instructed. If your symptoms worsen or fail to improve you should go to the Emergency Room.  WE CANNOT RULE OUT ALL POSSIBLE CAUSES OF YOUR SYMPTOMS IN THE URGENT CARE SETTING PLEASE GO TO THE ER IF YOU FEELS YOUR CONDITION IS WORSENING OR YOU WOULD LIKE EMERGENT EVALUATION.

## 2024-02-07 LAB
BACTERIA UR CULT: NORMAL
BACTERIA UR CULT: NORMAL

## 2024-05-06 PROBLEM — N39.0 URINARY TRACT INFECTIOUS DISEASE: Status: RESOLVED | Noted: 2024-02-05 | Resolved: 2024-05-06
